# Patient Record
(demographics unavailable — no encounter records)

---

## 2025-05-16 NOTE — HEALTH RISK ASSESSMENT
[0] : 2) Feeling down, depressed, or hopeless: Not at all (0) [PHQ-2 Negative - No further assessment needed] : PHQ-2 Negative - No further assessment needed [Never] : Never [[id number]] : 0

## 2025-05-16 NOTE — HISTORY OF PRESENT ILLNESS
[FreeTextEntry1] : establish care annual physical  [de-identified] : Ms. ULLOA is a 65 year old F with PMHx of Anxiety who presents today for new patient eval and establish care. Patient feels well at this time, no acute complaints. Denies chest pain, sob, goetz, dizziness, diaphoresis, palpitations, LE swelling, orthopnea, syncope, n/v, headache.

## 2025-05-16 NOTE — ADDENDUM
[FreeTextEntry1] : This note was written by Ann Toledo on 05/16/2025 acting as medical scribe for Dr. Charli Souza. I, Dr. Charli Souza, have read and attest that all the information, medical decision making and discharge instructions within are true and accurate.